# Patient Record
(demographics unavailable — no encounter records)

---

## 2025-01-08 NOTE — HISTORY OF PRESENT ILLNESS
[de-identified] : Last was seen in July, 2024 [FreeTextEntry1] : Interval History: ----------------------- arthritis started in October 2024, worsening progressively since, had temporary relief from c/steroid injection Pain and swelling in both feet right > left, toes started since December rash worsened since September 2024  debilitating left upper abdominal pain x 2

## 2025-01-08 NOTE — ASSESSMENT
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

## 2025-01-08 NOTE — PHYSICAL EXAM
[General Appearance - Alert] : alert [General Appearance - In No Acute Distress] : in no acute distress [General Appearance - Well Developed] : well developed [Sclera] : the sclera and conjunctiva were normal [Outer Ear] : the ears and nose were normal in appearance [Nasal Cavity] : the nasal mucosa and septum were normal [Respiration, Rhythm And Depth] : normal respiratory rhythm and effort [Heart Sounds] : normal S1 and S2 [Murmurs] : no murmurs [Edema] : there was no peripheral edema [Abdomen Soft] : soft [] : no hepato-splenomegaly [Motor Exam] : the motor exam was normal [Oriented To Time, Place, And Person] : oriented to person, place, and time [Impaired Insight] : insight and judgment were intact [Abnormal Walk] : normal gait [Musculoskeletal - Swelling] : no joint swelling seen [FreeTextEntry1] : left 2nd  dactylitis with swelling of PIP and MCP,  swelling and tenderness of 2nd right toe  / left SI tenderness - equivocal Martin's

## 2025-01-08 NOTE — HISTORY OF PRESENT ILLNESS
[de-identified] : Last was seen in July, 2024 [FreeTextEntry1] : Interval History: ----------------------- arthritis started in October 2024, worsening progressively since, had temporary relief from c/steroid injection Pain and swelling in both feet right > left, toes started since December rash worsened since September 2024  debilitating left upper abdominal pain x 2

## 2025-01-31 NOTE — HISTORY OF PRESENT ILLNESS
[de-identified] : Last was seen in January, 2025 [FreeTextEntry1] : Interval History: ----------------------- arthritis of hands/fingers and feet right > left, toes improved when was on steroids, now is back since she has been off it rash in the fold of lower abdomen- worsened - used Zoryve topically had MRI right hand- no erosions

## 2025-01-31 NOTE — ASSESSMENT
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

## 2025-01-31 NOTE — HISTORY OF PRESENT ILLNESS
[de-identified] : Last was seen in January, 2025 [FreeTextEntry1] : Interval History: ----------------------- arthritis of hands/fingers and feet right > left, toes improved when was on steroids, now is back since she has been off it rash in the fold of lower abdomen- worsened - used Zoryve topically had MRI right hand- no erosions

## 2025-03-01 NOTE — ASSESSMENT
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

## 2025-03-01 NOTE — HISTORY OF PRESENT ILLNESS
[de-identified] : Last was seen in January, 2025 [FreeTextEntry1] : Interval History: ----------------------- on Cosentyx x 4 weeks  arthritis of hands/fingers and feet - significantly better rash on abdominal wall and back- improved, but in lower abd fold- - no improvement and even worsening

## 2025-03-01 NOTE — HISTORY OF PRESENT ILLNESS
[de-identified] : Last was seen in January, 2025 [FreeTextEntry1] : Interval History: ----------------------- on Cosentyx x 4 weeks  arthritis of hands/fingers and feet - significantly better rash on abdominal wall and back- improved, but in lower abd fold- - no improvement and even worsening

## 2025-03-10 NOTE — HISTORY OF PRESENT ILLNESS
[FreeTextEntry1] : Zakia Garza is a 51 year old female with a history of lupus nephritis, SLE, psoriatic arthritis, who presents to establish care at hepatology clinic in the setting of positive hepatitis B core Ab, as well as hepatitis C Ab.   She says she has a history of hepatitis B in the past 2/2 sexual transmission, however, was never treated. She denies any history of liver disease in herself or family members. She has been on intermittent courses of steroids over the past year, most recently for four weeks one month ago of prednisone, and additionally was started on secukinumab by her rheumatologist for her psoriatic arthritis one month ago.   She reports one week of redness of the sclera of both her eyes along with purulent drainage, she denies any fever or chills. She denies any vision changes.   Social history: She denies any history of alcohol use, no tobacco use, lives in [place]  Family history: No history of liver diseases in family members Surgical hx: no signficant surgical hx  Medications: Zepbound .5mg weekly, vitamin D supplementation, fluconazole currently for interigo

## 2025-03-10 NOTE — PHYSICAL EXAM
[General Appearance - Alert] : alert [General Appearance - In No Acute Distress] : in no acute distress [Outer Ear] : the ears and nose were normal in appearance [Hearing Threshold Finger Rub Not [place]] : hearing was normal [Neck Appearance] : the appearance of the neck was normal [] : no respiratory distress [Respiration, Rhythm And Depth] : normal respiratory rhythm and effort [Apical Impulse] : the apical impulse was normal [Heart Rate And Rhythm] : heart rate was normal and rhythm regular [Bowel Sounds] : normal bowel sounds [Abdomen Soft] : soft [Abnormal Walk] : normal gait [Musculoskeletal - Swelling] : no joint swelling seen [Skin Color & Pigmentation] : normal skin color and pigmentation [Skin Turgor] : normal skin turgor [Oriented To Time, Place, And Person] : oriented to person, place, and time [Impaired Insight] : insight and judgment were intact [Affect] : the affect was normal [Scleral Icterus] : No Scleral Icterus [Asterixis] : no asterixis observed [Jaundice] : No jaundice [FreeTextEntry1] : erythema of both sclera, no drainage seen from either eye

## 2025-03-10 NOTE — PHYSICAL EXAM
[General Appearance - Alert] : alert [General Appearance - In No Acute Distress] : in no acute distress [Outer Ear] : the ears and nose were normal in appearance [Hearing Threshold Finger Rub Not [place]] : hearing was normal [Neck Appearance] : the appearance of the neck was normal [] : no respiratory distress [Respiration, Rhythm And Depth] : normal respiratory rhythm and effort [Apical Impulse] : the apical impulse was normal [Heart Rate And Rhythm] : heart rate was normal and rhythm regular [Bowel Sounds] : normal bowel sounds [Abdomen Soft] : soft [Abnormal Walk] : normal gait [Musculoskeletal - Swelling] : no joint swelling seen [Skin Color & Pigmentation] : normal skin color and pigmentation [Skin Turgor] : normal skin turgor [Oriented To Time, Place, And Person] : oriented to person, place, and time [Impaired Insight] : insight and judgment were intact [Affect] : the affect was normal [Scleral Icterus] : No Scleral Icterus [Asterixis] : no asterixis observed [Jaundice] : No jaundice [FreeTextEntry1] : erythema of both sclera, no drainage seen from either eye

## 2025-03-10 NOTE — END OF VISIT
[] : Fellow [FreeTextEntry3] : I personally reviewed the patient's case with the Fellow and attest to the accuracy of their H&P and plan. [Time Spent: ___ minutes] : I have spent [unfilled] minutes of time on the encounter which excludes teaching and separately reported services.

## 2025-03-10 NOTE — ASSESSMENT
[FreeTextEntry1] : Zakia Garza is a 51 year old female with a history of lupus nephritis, SLE, psoriatic arthritis, who presents to establish care at hepatology clinic in the setting of positive hepatitis B core Ab, as well as hepatitis C Ab.   #Hepatitis B core Ab positive  -She says she has a history of hepatitis B in the past 2/2 sexual transmission, however, was never treated. She denies any history of liver disease in herself or family members. She has been on intermittent courses of steroids over the past year, most recently for four weeks one month ago of prednisone, and additionally was started on secukinumab by her rheumatologist for her psoriatic arthritis one month ago.  -No evidence of active infection, PCR undetectable -Recommend treatment with entecavir .5mg daily, sent to pharmacy, patient is at moderate risk for reactivation -Patient should remain on entecavir for as long as she takes secukinumab  #Elevated alkaline phosphatase -Ddx includes MASLD, hepatitis B, though less likely, PBC, or bone source -GGT, CMP, GGT, antimitochondrial antibody ordered today  -U/S abdomen to be performed before next appointment   #Conjuctivits -Viral vs. bacterial vs. episcleritis -Prescribed ciprofloxacin eye drops for 7 days, patient counseled that if symptoms do not improve please contact rheumatologist due to concern for potential autoimmune etiology  RTC in 3 months.   Case discussed with Dr. Villalobos.   Pao Karimi MD Gastroenterology/Hepatology PGY-4
[FreeTextEntry1] : Zakia Garza is a 51 year old female with a history of lupus nephritis, SLE, psoriatic arthritis, who presents to establish care at hepatology clinic in the setting of positive hepatitis B core Ab, as well as hepatitis C Ab.   #Hepatitis B core Ab positive  -She says she has a history of hepatitis B in the past 2/2 sexual transmission, however, was never treated. She denies any history of liver disease in herself or family members. She has been on intermittent courses of steroids over the past year, most recently for four weeks one month ago of prednisone, and additionally was started on secukinumab by her rheumatologist for her psoriatic arthritis one month ago.  -No evidence of active infection, PCR undetectable -Recommend treatment with entecavir .5mg daily, sent to pharmacy, patient is at moderate risk for reactivation -Patient should remain on entecavir for as long as she takes secukinumab  #Elevated alkaline phosphatase -Ddx includes MASLD, hepatitis B, though less likely, PBC, or bone source -GGT, CMP, GGT, antimitochondrial antibody ordered today  -U/S abdomen to be performed before next appointment   #Conjuctivits -Viral vs. bacterial vs. episcleritis -Prescribed ciprofloxacin eye drops for 7 days, patient counseled that if symptoms do not improve please contact rheumatologist due to concern for potential autoimmune etiology  RTC in 3 months.   Case discussed with Dr. Villalobos.   Pao Karimi MD Gastroenterology/Hepatology PGY-4
Family

## 2025-05-28 NOTE — PHYSICAL EXAM
[General Appearance - Alert] : alert [General Appearance - In No Acute Distress] : in no acute distress [General Appearance - Well Developed] : well developed [Sclera] : the sclera and conjunctiva were normal [Outer Ear] : the ears and nose were normal in appearance [Nasal Cavity] : the nasal mucosa and septum were normal [Respiration, Rhythm And Depth] : normal respiratory rhythm and effort [Heart Sounds] : normal S1 and S2 [Murmurs] : no murmurs [Edema] : there was no peripheral edema [Abdomen Soft] : soft [] : no hepato-splenomegaly [Motor Exam] : the motor exam was normal [Oriented To Time, Place, And Person] : oriented to person, place, and time [Impaired Insight] : insight and judgment were intact [Abnormal Walk] : normal gait [Musculoskeletal - Swelling] : no joint swelling seen [FreeTextEntry1] : left 2nd right finger - decreased swelling , but not able to flex in  PIP ,  swelling and tenderness of other fingers and / toes -resolved

## 2025-05-28 NOTE — HISTORY OF PRESENT ILLNESS
[de-identified] : Last was seen in February, 2025 [FreeTextEntry1] : Interval History: ----------------------- - on Cosentyx every 4 weeks  - arthritis of hands/fingers and feet - significantly improved and was doing ok with rash until 2 weeks ago developed extensive rash on scalp, neck along hairline, face, alopecia rash under brerast , and intertriginal folds- - on and off  - has not been on antiviral tx for hepatitis B due to poor coordination of care with hepatology

## 2025-05-28 NOTE — ASSESSMENT
[FreeTextEntry1] : Psoriatic arthritis with dactylitis-  although significantly improved on Cosentyx, but now with severe worsening of rash  - r/o Psoriasis flare / vs lupus related rash inverse psoriasis plaque in patellar fosses - poor response to topical steroids Cutaneous candidiasis, refractory to  Clotrimazole topically SLE- with history of Lupus nephritis- s/p CTX and Imuran tx, now in remission off tx Hepatitis B core ab - consult with hepatologist was done but no fu- not on antiviral tx postmenopausal since 2019, r/o osteoporosis  -- labs -- dermatology fu ASAP , consider skin bx  -- will consider Fluconazole 100 mg BID x 2 weeks  -- if + derm confirms psorisis - may consider to increase Cosentyx   -- hepatology fu -- DEXA scan     FU 6-8 weeks

## 2025-07-10 NOTE — HISTORY OF PRESENT ILLNESS
[de-identified] : Last was seen in May,  2025 [FreeTextEntry1] : Interval History: ----------------------- - on Cosentyx 300 mg every 4 weeks - recived only one higher dose - arthritis of hands/fingers and feet - significantly improved aand rash decreased to resolved   - saw hepatologist re + hepatitis B ab - no treatment needed for now

## 2025-07-10 NOTE — PHYSICAL EXAM
[General Appearance - Alert] : alert [General Appearance - In No Acute Distress] : in no acute distress [General Appearance - Well Developed] : well developed [Sclera] : the sclera and conjunctiva were normal [Outer Ear] : the ears and nose were normal in appearance [Nasal Cavity] : the nasal mucosa and septum were normal [Respiration, Rhythm And Depth] : normal respiratory rhythm and effort [Heart Sounds] : normal S1 and S2 [Murmurs] : no murmurs [Edema] : there was no peripheral edema [Abdomen Soft] : soft [] : no hepato-splenomegaly [Motor Exam] : the motor exam was normal [Oriented To Time, Place, And Person] : oriented to person, place, and time [Impaired Insight] : insight and judgment were intact [Abnormal Walk] : normal gait [Musculoskeletal - Swelling] : no joint swelling seen [FreeTextEntry1] : left 2nd right finger - no more swelling , no tenderness, good ROM swelling and tenderness of other fingers and / toes -resolved

## 2025-07-10 NOTE — HISTORY OF PRESENT ILLNESS
[de-identified] : Last was seen in May,  2025 [FreeTextEntry1] : Interval History: ----------------------- - on Cosentyx 300 mg every 4 weeks - recived only one higher dose - arthritis of hands/fingers and feet - significantly improved aand rash decreased to resolved   - saw hepatologist re + hepatitis B ab - no treatment needed for now

## 2025-07-10 NOTE — ASSESSMENT
[FreeTextEntry1] : ## Psoriatic arthritis with dactylitis-  although significantly improved on Cosentyx, but now with severe worsening of rash  ## Psoriasis  ## SLE- with history of Lupus nephritis- s/p CTX and Imuran tx, now in remission off tx ## Hepatitis B core ab - consult with hepatologist was done but no fu- not on antiviral tx postmenopausal since 2019, r/o osteoporosis ## Osteopenia, postmenopausal   -- labs in 1 month and every 3 months -- continue Cosentyx 300 q 4 weeks -- hepatology fu       FU 10 - 12 weeks